# Patient Record
Sex: MALE | Race: WHITE | NOT HISPANIC OR LATINO | Employment: FULL TIME | URBAN - NONMETROPOLITAN AREA
[De-identification: names, ages, dates, MRNs, and addresses within clinical notes are randomized per-mention and may not be internally consistent; named-entity substitution may affect disease eponyms.]

---

## 2022-12-14 ENCOUNTER — HOSPITAL ENCOUNTER (EMERGENCY)
Facility: HOSPITAL | Age: 22
Discharge: HOME | End: 2022-12-14
Attending: EMERGENCY MEDICINE | Admitting: EMERGENCY MEDICINE

## 2022-12-14 VITALS
DIASTOLIC BLOOD PRESSURE: 92 MMHG | OXYGEN SATURATION: 100 % | TEMPERATURE: 97.7 F | SYSTOLIC BLOOD PRESSURE: 152 MMHG | BODY MASS INDEX: 31.67 KG/M2 | HEIGHT: 68 IN | WEIGHT: 209 LBS | RESPIRATION RATE: 18 BRPM | HEART RATE: 77 BPM

## 2022-12-14 DIAGNOSIS — L05.01 PILONIDAL ABSCESS: Primary | ICD-10-CM

## 2022-12-14 PROCEDURE — 87070 CULTURE OTHR SPECIMN AEROBIC: CPT

## 2022-12-14 PROCEDURE — 87186 SC STD MICRODIL/AGAR DIL: CPT

## 2022-12-14 PROCEDURE — 87077 CULTURE AEROBIC IDENTIFY: CPT

## 2022-12-14 PROCEDURE — 99283 EMERGENCY DEPT VISIT LOW MDM: CPT

## 2022-12-14 PROCEDURE — 87205 SMEAR GRAM STAIN: CPT

## 2022-12-14 PROCEDURE — 0 LIDOCAINE 1 % SOLUTION

## 2022-12-14 RX ORDER — HYDROCODONE BITARTRATE AND ACETAMINOPHEN 5; 325 MG/1; MG/1
1 TABLET ORAL EVERY 6 HOURS PRN
Qty: 10 TABLET | Refills: 0 | Status: SHIPPED | OUTPATIENT
Start: 2022-12-14

## 2022-12-14 RX ORDER — LIDOCAINE HYDROCHLORIDE 10 MG/ML
10 INJECTION, SOLUTION INFILTRATION; PERINEURAL ONCE
Status: COMPLETED | OUTPATIENT
Start: 2022-12-14 | End: 2022-12-14

## 2022-12-14 RX ORDER — HYDROCODONE BITARTRATE AND ACETAMINOPHEN 5; 325 MG/1; MG/1
1 TABLET ORAL ONCE
Status: COMPLETED | OUTPATIENT
Start: 2022-12-14 | End: 2022-12-14

## 2022-12-14 RX ADMIN — HYDROCODONE BITARTRATE AND ACETAMINOPHEN 1 TABLET: 5; 325 TABLET ORAL at 13:56

## 2022-12-14 RX ADMIN — LIDOCAINE HYDROCHLORIDE 10 ML: 10 INJECTION, SOLUTION INFILTRATION; PERINEURAL at 13:56

## 2022-12-14 NOTE — ED PROVIDER NOTES
Subjective   History of Present Illness  Patient is a 22-year-old male here today with a pilonidal cyst.  The patient states that he is currently being seen for this complaint by his primary provider and has been on antibiotics for 2 days.  He was advised to come to the ER if it was not getting any better or if it started to increase in size and become more painful.  He is here today because it has increased in size and is more painful.  Unsure of the antibiotic that he is taking.  He has not noticed any drainage from the area.  No fever.  This is his first pilonidal cyst.      Review of Systems   All other systems reviewed and are negative.      History reviewed. No pertinent past medical history.    No Known Allergies    Past Surgical History:   Procedure Laterality Date   • SHOULDER SURGERY Right    • TONSILLECTOMY         History reviewed. No pertinent family history.    Social History     Socioeconomic History   • Marital status:    Tobacco Use   • Smoking status: Never   Substance and Sexual Activity   • Alcohol use: Yes     Comment: socially   • Drug use: Never           Objective   Physical Exam  Vitals and nursing note reviewed.   Constitutional:       Appearance: Normal appearance. He is normal weight.   HENT:      Head: Normocephalic and atraumatic.   Cardiovascular:      Rate and Rhythm: Normal rate and regular rhythm.      Pulses: Normal pulses.      Heart sounds: Normal heart sounds.   Pulmonary:      Effort: Pulmonary effort is normal.      Breath sounds: Normal breath sounds.   Abdominal:      General: Abdomen is flat. Bowel sounds are normal. There is no distension.      Palpations: Abdomen is soft.      Tenderness: There is no abdominal tenderness.   Musculoskeletal:      Right lower leg: No edema.      Left lower leg: No edema.   Skin:     General: Skin is warm and dry.          Neurological:      General: No focal deficit present.      Mental Status: He is alert and oriented to person,  place, and time.   Psychiatric:         Mood and Affect: Mood normal.         Behavior: Behavior normal.         Incision & Drainage    Date/Time: 12/14/2022 3:16 PM  Performed by: Niko Hess APRN  Authorized by: Eduardo Davis DO     Consent:     Consent obtained:  Verbal    Consent given by:  Patient    Risks discussed:  Incomplete drainage  Universal protocol:     Patient identity confirmed:  Verbally with patient and arm band  Location:     Type:  Pilonidal cyst    Location:  Anogenital    Anogenital location:  Gluteal cleft  Pre-procedure details:     Skin preparation:  Chlorhexidine with alcohol  Sedation:     Sedation type:  None  Anesthesia:     Anesthesia method:  Local infiltration    Local anesthetic:  Lidocaine 1% w/o epi  Procedure type:     Complexity:  Simple  Procedure details:     Ultrasound guidance: yes      Incision types:  Single straight    Wound management:  Probed and deloculated and irrigated with saline    Drainage:  Purulent and bloody    Drainage amount:  Moderate    Wound treatment:  Drain placed    Packing materials:  1/2 in iodoform gauze  Post-procedure details:     Procedure completion:  Tolerated               ED Course                                           MDM  Number of Diagnoses or Management Options  Pilonidal abscess  Diagnosis management comments: Patient is a 22-year-old male here today with a pilonidal cyst.  He is currently taking doxycycline prescribed by his other provider.  Patient was agreeable to an incision and drainage.  Moderate amount of purulent drainage and bloody drainage was removed during the procedure.  A small amount of packing was inserted within the abscess to help the wound continue to drain.  Advised him to continue his doxycycline until his wound culture results return.  If there is a need for change, I advised him that he will be notified and the appropriate antibiotic will be provided.  Otherwise, he can remove the packing in 2 days unless  it falls out on its own before that.  He needs to follow-up with his primary provider for reevaluation and later this week, if able.  Pain medication prescription provided.       Amount and/or Complexity of Data Reviewed  Tests in the medicine section of CPT®: ordered and reviewed  Discussion of test results with the performing providers: yes  Discuss the patient with other providers: yes    Patient Progress  Patient progress: stable      Final diagnoses:   Pilonidal abscess       ED Disposition  ED Disposition     ED Disposition   Discharge    Condition   Stable    Comment   --             Provider, No Known  [place] [id number] [phone]    Schedule an appointment as soon as possible for a visit   As needed         Medication List      New Prescriptions    HYDROcodone-acetaminophen 5-325 MG per tablet  Commonly known as: NORCO  Take 1 tablet by mouth Every 6 (Six) Hours As Needed for Moderate Pain or Severe Pain.           Where to Get Your Medications      These medications were sent to [facility] - [place], [place] - [address] [place] [address] [phone]  - [phone] [address] [address]    Phone: [phone]   · HYDROcodone-acetaminophen 5-325 MG per tablet          Niko Hess, APRN  12/14/22 1548

## 2022-12-14 NOTE — DISCHARGE INSTRUCTIONS
Continue taking the doxycycline.  If the wound culture shows that the bacteria is resistant to doxycycline, you will be notified and a different antibiotic will be provided.  Remove the packing in 2 days, it is okay if it falls out on its own before this.  Recommend that you have your primary provider recheck this later this week, if able.

## 2022-12-17 LAB
BACTERIA SPEC AEROBE CULT: ABNORMAL
GRAM STN SPEC: ABNORMAL
GRAM STN SPEC: ABNORMAL

## 2022-12-19 NOTE — PROGRESS NOTES
Wound culture of the pilonidal cyst shows 3+ streptococcus constellatus ssp. Patient continued his doxycycline. Discussed with Dr. Davis, this should be appropriate treatment.